# Patient Record
Sex: MALE | Race: WHITE | HISPANIC OR LATINO | Employment: FULL TIME | URBAN - METROPOLITAN AREA
[De-identification: names, ages, dates, MRNs, and addresses within clinical notes are randomized per-mention and may not be internally consistent; named-entity substitution may affect disease eponyms.]

---

## 2024-03-31 ENCOUNTER — HOSPITAL ENCOUNTER (EMERGENCY)
Facility: MEDICAL CENTER | Age: 29
End: 2024-03-31
Attending: EMERGENCY MEDICINE

## 2024-03-31 VITALS
BODY MASS INDEX: 28.63 KG/M2 | RESPIRATION RATE: 18 BRPM | WEIGHT: 200 LBS | HEIGHT: 70 IN | HEART RATE: 70 BPM | SYSTOLIC BLOOD PRESSURE: 136 MMHG | DIASTOLIC BLOOD PRESSURE: 89 MMHG | TEMPERATURE: 98.8 F | OXYGEN SATURATION: 97 %

## 2024-03-31 DIAGNOSIS — J10.1 INFLUENZA B: ICD-10-CM

## 2024-03-31 DIAGNOSIS — B34.9 ACUTE VIRAL SYNDROME: ICD-10-CM

## 2024-03-31 DIAGNOSIS — R52 GENERALIZED BODY ACHES: ICD-10-CM

## 2024-03-31 LAB
EKG IMPRESSION: NORMAL
FLUAV RNA SPEC QL NAA+PROBE: NEGATIVE
FLUBV RNA SPEC QL NAA+PROBE: POSITIVE
RSV RNA SPEC QL NAA+PROBE: NEGATIVE
SARS-COV-2 RNA RESP QL NAA+PROBE: NOTDETECTED

## 2024-03-31 PROCEDURE — 96372 THER/PROPH/DIAG INJ SC/IM: CPT

## 2024-03-31 PROCEDURE — 93005 ELECTROCARDIOGRAM TRACING: CPT | Performed by: EMERGENCY MEDICINE

## 2024-03-31 PROCEDURE — 0241U HCHG SARS-COV-2 COVID-19 NFCT DS RESP RNA 4 TRGT ED POC: CPT

## 2024-03-31 PROCEDURE — 99283 EMERGENCY DEPT VISIT LOW MDM: CPT

## 2024-03-31 PROCEDURE — 700111 HCHG RX REV CODE 636 W/ 250 OVERRIDE (IP): Performed by: EMERGENCY MEDICINE

## 2024-03-31 PROCEDURE — 93005 ELECTROCARDIOGRAM TRACING: CPT

## 2024-03-31 RX ORDER — KETOROLAC TROMETHAMINE 10 MG/1
10 TABLET, FILM COATED ORAL 3 TIMES DAILY PRN
Qty: 15 TABLET | Refills: 0 | Status: SHIPPED | OUTPATIENT
Start: 2024-03-31 | End: 2024-03-31

## 2024-03-31 RX ORDER — KETOROLAC TROMETHAMINE 10 MG/1
10 TABLET, FILM COATED ORAL 3 TIMES DAILY PRN
Qty: 15 TABLET | Refills: 0 | Status: SHIPPED | OUTPATIENT
Start: 2024-03-31

## 2024-03-31 RX ORDER — KETOROLAC TROMETHAMINE 15 MG/ML
15 INJECTION, SOLUTION INTRAMUSCULAR; INTRAVENOUS ONCE
Status: COMPLETED | OUTPATIENT
Start: 2024-03-31 | End: 2024-03-31

## 2024-03-31 RX ADMIN — KETOROLAC TROMETHAMINE 15 MG: 15 INJECTION, SOLUTION INTRAMUSCULAR; INTRAVENOUS at 18:59

## 2024-03-31 NOTE — Clinical Note
Dick Tolbert was seen and treated in our emergency department on 3/31/2024.  He may return to work on 04/02/2024.       If you have any questions or concerns, please don't hesitate to call.      Nikkie Constantino D.O.

## 2024-04-01 NOTE — DISCHARGE INSTRUCTIONS
Tylenol every 4 hours for fever greater than 101   Take the medication and prescribing you as directed with food for body aches.  Rest, increase fluids, hot tea with lemon and honey  No work for 2 days  Follow-up with [facility] for recheck in 1 week if symptoms persist

## 2024-04-01 NOTE — ED TRIAGE NOTES
"Chief Complaint   Patient presents with    Flu Like Symptoms     Pt reports \"bone pain\" in chest, arms, head x5 days. Pt reports fever, chills, cough as well     Pt ambulatory to triage for above complaint. Pt reports pain when coughing/sneezing.     Pt is alert & oriented and follows commands. Pt speaking in full sentences and responds appropriately to questions. No acute distress noted in triage. Respirations are even and unlabored. Skin is pink/warm/dry.    Pt placed back in lobby and educated on triage process. Pt encouraged to alert staff to any changes in condition.    "

## 2024-04-01 NOTE — ED PROVIDER NOTES
"ER Provider Note    Scribed for Dr. Nikkie Constantino D.O. by Dilan Larsen. 3/31/2024  6:41 PM    Primary Care Provider: No primary care provider noted.    CHIEF COMPLAINT  Chief Complaint   Patient presents with    Flu Like Symptoms     Pt reports \"bone pain\" in chest, arms, head x5 days. Pt reports fever, chills, cough as well       EXTERNAL RECORDS REVIEWED  None    HPI/ROS  LIMITATION TO HISTORY   Select: : None    Dick Tolbert is a 29 y.o. male who presents to the ED for flu like symptoms onset earlier today. The patient notes that he has associated generalized body aches, nasal congestion, and a dry cough. Denies nausea or vomiting. No alleviating or exacerbating factors noted. The patient has no known allergies or other medical issues.    PAST MEDICAL HISTORY  History reviewed. No pertinent past medical history.    SURGICAL HISTORY  History reviewed. No pertinent surgical history.    FAMILY HISTORY  History reviewed. No pertinent family history.    SOCIAL HISTORY   reports that he has never smoked. He has never used smokeless tobacco. He reports current alcohol use of about 2.4 oz of alcohol per week. He reports that he does not currently use drugs.    ALLERGIES  Patient has no known allergies.    PHYSICAL EXAM  BP (!) 148/97   Pulse 72   Temp 37 °C (98.6 °F) (Temporal)   Resp 18   Ht 1.778 m (5' 10\")   Wt 90.7 kg (200 lb)   SpO2 97%   BMI 28.70 kg/m²     Constitutional: Patient is well developed, well nourished. Non-toxic appearing.  Mild distress.  HENT: Normocephalic, atraumatic, Oral mucosa moist. Posterior nasopharynx clear.  Cardiovascular: Normal heart rate and Regular rhythm. No murmur  Thorax & Lungs: Clear and equal breath sounds with good excursion.  Abdomen: Soft and non tender.   Extremities: No edema  Neurologic: Alert & oriented x 3, Normal motor function, Normal sensory function  Psychiatric: Affect normal, Judgment normal, Mood normal.    DIAGNOSTIC STUDIES & PROCEDURES    Labs: "   Results for orders placed or performed during the hospital encounter of 24   EKG   Result Value Ref Range    Report       [facility] Emergency Dept.    Test Date:  2024  Pt Name:    JANNIE MARTÍNEZ       Department: ER  MRN:        [id number]                      Room:  Gender:     Male                         Technician: [id number]  :        1995                   Requested By:ER TRIAGE PROTOCOL  Order #:    [id number]                    Reading MD:    Measurements  Intervals                                Axis  Rate:       66                           P:          7  AZ:         155                          QRS:        127  QRSD:       95                           T:          23  QT:         391  QTc:        410    Interpretive Statements  Sinus rhythm  Right axis deviation  No previous ECG available for comparison     POC CoV-2, FLU A/B, RSV by PCR   Result Value Ref Range    POC Influenza A RNA, PCR Negative Negative    POC Influenza B RNA, PCR POSITIVE (A) Negative    POC RSV, by PCR Negative Negative    POC SARS-CoV-2, PCR NotDetected    All labs reviewed by me.    EKG:   I have independently interpreted this EKG     COURSE & MEDICAL DECISION MAKING    ED Observation Status? No; Patient does not meet criteria for ED Observation.     INITIAL ASSESSMENT AND PLAN  Care Narrative:     5:54 PM - Per protocol EKG, CoV-2 Flu A/B and RSV PCR was ordered to evaluate.      6:41 PM - Patient seen and evaluated at bedside. Discussed plan of care, including lab analysis to evaluate for a potential viral infection. Patient agrees to plan of care. Patient will be treated with Toradol 15 mg PO for his symptoms. Differential diagnoses include but are not limited to: Influenza versus Covid.    Patient was positive for influenza B, he feels much better after the Toradol shot and will be given a prescription for Toradol for home.  He is to rest, increase fluids, warm salt water gargles, hot  tea with lemon and honey, Tylenol for fever and follow-up with [facility] as needed.    7:00 PM - I reevaluated the patient at bedside. I discussed the patient's diagnostic study results which show that they have Influenza B. The patient will receive Toradol as an outpatient medication. I discussed plan for discharge and follow up as outlined below. The patient is stable for discharge at this time and will return for any new or worsening symptoms. Patient verbalizes understanding and support with my plan for discharge.                    DISPOSITION AND DISCUSSIONS  I have discussed management of the patient with the following physicians and FELIX's: None    Discussion of management with other [facility] or appropriate source(s): None     Barriers to care at this time, including but not limited to: Patient does not have established PCP and Patient does not have insurance.     Decision tools and prescription drugs considered including, but not limited to:  Medication: Toradol .    The patient will return for new or worsening symptoms and is stable at the time of discharge.    The patient is referred to a primary physician for blood pressure management, diabetic screening, and for all other preventative health concerns.    DISPOSITION:  Patient will be discharged home in stable condition.    FOLLOW UP:  [address] [facility] [address]  [phone]  Schedule an appointment as soon as possible for a visit in 1 week  As needed, If symptoms worsen    OUTPATIENT MEDICATIONS:  New Prescriptions    KETOROLAC (TORADOL) 10 MG TAB    Take 1 Tablet by mouth 3 times a day as needed for Moderate Pain. Take with food     FINAL IMPRESSION   1. Influenza B    2. Generalized body aches    3. Acute viral syndrome       Dilan THOMAS), am scribing for, and in the presence of, Nikkie Constantino D.O..    Electronically signed by: Dilan Reynolds), 3/31/2024    Nikkie THOMAS D.O.  personally performed the services described in this documentation, as scribed by Dilan Larsen in my presence, and it is both accurate and complete.    The note accurately reflects work and decisions made by me.  Nikkie Constantino D.O.  3/31/2024  7:12 PM\